# Patient Record
Sex: FEMALE | Race: BLACK OR AFRICAN AMERICAN
[De-identification: names, ages, dates, MRNs, and addresses within clinical notes are randomized per-mention and may not be internally consistent; named-entity substitution may affect disease eponyms.]

---

## 2019-09-05 ENCOUNTER — HOSPITAL ENCOUNTER (EMERGENCY)
Dept: HOSPITAL 62 - ER | Age: 29
LOS: 4 days | Discharge: PSYCHIATRIC OR SUBSTANCE USE FACILITY | End: 2019-09-09
Payer: SELF-PAY

## 2019-09-05 DIAGNOSIS — T65.892A: Primary | ICD-10-CM

## 2019-09-05 DIAGNOSIS — T54.92XA: ICD-10-CM
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
## 2019-09-05 DIAGNOSIS — F23: ICD-10-CM

## 2019-09-05 LAB
ADD MANUAL DIFF: NO
ALBUMIN SERPL-MCNC: 4.5 G/DL (ref 3.5–5)
ALP SERPL-CCNC: 49 U/L (ref 38–126)
ANION GAP SERPL CALC-SCNC: 9 MMOL/L (ref 5–19)
APAP SERPL-MCNC: < 10 UG/ML (ref 10–30)
APPEARANCE UR: (no result)
APTT PPP: (no result) S
AST SERPL-CCNC: 34 U/L (ref 14–36)
BARBITURATES UR QL SCN: NEGATIVE
BASOPHILS # BLD AUTO: 0 10^3/UL (ref 0–0.2)
BASOPHILS NFR BLD AUTO: 0.4 % (ref 0–2)
BILIRUB DIRECT SERPL-MCNC: 0.3 MG/DL (ref 0–0.4)
BILIRUB SERPL-MCNC: 0.9 MG/DL (ref 0.2–1.3)
BILIRUB UR QL STRIP: (no result)
BUN SERPL-MCNC: 14 MG/DL (ref 7–20)
CALCIUM: 9.7 MG/DL (ref 8.4–10.2)
CHLORIDE SERPL-SCNC: 109 MMOL/L (ref 98–107)
CO2 SERPL-SCNC: 23 MMOL/L (ref 22–30)
EOSINOPHIL # BLD AUTO: 0 10^3/UL (ref 0–0.6)
EOSINOPHIL NFR BLD AUTO: 0.2 % (ref 0–6)
ERYTHROCYTE [DISTWIDTH] IN BLOOD BY AUTOMATED COUNT: 13.4 % (ref 11.5–14)
ETHANOL SERPL-MCNC: < 10 MG/DL
GLUCOSE SERPL-MCNC: 110 MG/DL (ref 75–110)
GLUCOSE UR STRIP-MCNC: 150 MG/DL
HCT VFR BLD CALC: 38.5 % (ref 36–47)
HGB BLD-MCNC: 12.5 G/DL (ref 12–15.5)
KETONES UR STRIP-MCNC: (no result) MG/DL
LYMPHOCYTES # BLD AUTO: 0.9 10^3/UL (ref 0.5–4.7)
LYMPHOCYTES NFR BLD AUTO: 18.8 % (ref 13–45)
MCH RBC QN AUTO: 26.6 PG (ref 27–33.4)
MCHC RBC AUTO-ENTMCNC: 32.5 G/DL (ref 32–36)
MCV RBC AUTO: 82 FL (ref 80–97)
METHADONE UR QL SCN: NEGATIVE
MONOCYTES # BLD AUTO: 0.5 10^3/UL (ref 0.1–1.4)
MONOCYTES NFR BLD AUTO: 11 % (ref 3–13)
NEUTROPHILS # BLD AUTO: 3.2 10^3/UL (ref 1.7–8.2)
NEUTS SEG NFR BLD AUTO: 69.6 % (ref 42–78)
NITRITE UR QL STRIP: NEGATIVE
PCP UR QL SCN: NEGATIVE
PH UR STRIP: 5 [PH] (ref 5–9)
PLATELET # BLD: 244 10^3/UL (ref 150–450)
POTASSIUM SERPL-SCNC: 3.3 MMOL/L (ref 3.6–5)
PROT SERPL-MCNC: 7.8 G/DL (ref 6.3–8.2)
PROT UR STRIP-MCNC: 100 MG/DL
RBC # BLD AUTO: 4.7 10^6/UL (ref 3.72–5.28)
SALICYLATES SERPL-MCNC: < 1 MG/DL (ref 2–20)
SP GR UR STRIP: 1.04
TOTAL CELLS COUNTED % (AUTO): 100 %
URINE AMPHETAMINES SCREEN: NEGATIVE
URINE BENZODIAZEPINES SCREEN: (no result)
URINE COCAINE SCREEN: NEGATIVE
URINE MARIJUANA (THC) SCREEN: NEGATIVE
UROBILINOGEN UR-MCNC: 2 MG/DL (ref ?–2)
WBC # BLD AUTO: 4.6 10^3/UL (ref 4–10.5)

## 2019-09-05 PROCEDURE — 93010 ELECTROCARDIOGRAM REPORT: CPT

## 2019-09-05 PROCEDURE — 99285 EMERGENCY DEPT VISIT HI MDM: CPT

## 2019-09-05 PROCEDURE — 85025 COMPLETE CBC W/AUTO DIFF WBC: CPT

## 2019-09-05 PROCEDURE — 81001 URINALYSIS AUTO W/SCOPE: CPT

## 2019-09-05 PROCEDURE — S0164 INJECTION PANTROPRAZOLE: HCPCS

## 2019-09-05 PROCEDURE — 96361 HYDRATE IV INFUSION ADD-ON: CPT

## 2019-09-05 PROCEDURE — 36415 COLL VENOUS BLD VENIPUNCTURE: CPT

## 2019-09-05 PROCEDURE — 80307 DRUG TEST PRSMV CHEM ANLYZR: CPT

## 2019-09-05 PROCEDURE — 84703 CHORIONIC GONADOTROPIN ASSAY: CPT

## 2019-09-05 PROCEDURE — 93005 ELECTROCARDIOGRAM TRACING: CPT

## 2019-09-05 PROCEDURE — 80053 COMPREHEN METABOLIC PANEL: CPT

## 2019-09-05 PROCEDURE — 96374 THER/PROPH/DIAG INJ IV PUSH: CPT
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
## 2019-09-06 RX ADMIN — HALOPERIDOL SCH MG: 5 TABLET ORAL at 18:47

## 2019-09-06 RX ADMIN — HALOPERIDOL SCH: 5 TABLET ORAL at 18:51

## 2019-09-06 RX ADMIN — BENZTROPINE MESYLATE SCH MG: 1 TABLET ORAL at 18:46

## 2019-09-06 NOTE — ER DOCUMENT REPORT
Doctor's Note


Notes: 





09/06/19 18:46


The patient is doing better today.  She was started on Haldol 5mg twice daily, 

and will be re-evaluated in the morning.
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
## 2019-09-07 RX ADMIN — HALOPERIDOL SCH MG: 5 TABLET ORAL at 09:47

## 2019-09-07 RX ADMIN — BENZTROPINE MESYLATE SCH MG: 1 TABLET ORAL at 09:47

## 2019-09-07 RX ADMIN — HALOPERIDOL SCH MG: 5 TABLET ORAL at 18:37

## 2019-09-07 NOTE — ER DOCUMENT REPORT
Doctor's Note


Notes: 





09/07/19 19:22


Rounds: Chart reviewed and patient reviewed.  Patient was initially seen for 

paranoia and psychosis.  She reportedly had been drinking cleanser with bleach 

in it.  She denies any difficulty swallowing or breathing.  Says that she has 

been here for 48 hours and is ready to be discharged.  Oral exam is normal.  No 

swelling or indications of injury to the posterior oropharynx.  Voice is normal.

 Lungs are clear.  Vital signs are all normal.  Labs are essentially 

unremarkable.  Patient appears to be medically stable for transfer or discharge.


PALLAVI Knight MD

## 2019-09-08 RX ADMIN — HALOPERIDOL SCH MG: 5 TABLET ORAL at 18:33

## 2019-09-08 RX ADMIN — BENZTROPINE MESYLATE SCH MG: 1 TABLET ORAL at 10:17

## 2019-09-08 RX ADMIN — HALOPERIDOL SCH MG: 5 TABLET ORAL at 10:16
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
## 2019-09-08 NOTE — PSYCHOLOGICAL NOTE
Psych Note





- Psych Note


Date seen by psych provider: 09/08/19


Time seen by psych provider: 08:31 - Chart review at 0831. Discussion with ED 

Physician.


Psych Note: 


Presenting Problem: IVC, OD via drank half a bottle of cleaning agent with 

bleach after a first bottle was removed from her, tried to take rat poisoning 

but it was removed from her, she did these OD attempts in [facility] with her 3 

children (2 are with her sister, 1 is with the child's father), paranoia. She 

was started on Haldol 5MG BID and Cogentin 1MG QD 09/06/19. Today per attending 

ED Physician patient still presented paranoid. She has been calm, cooperative 

and pleasant while in the ED. She is on her menses currently. UDS was positive 

for Benzodiazepines (EMS had to administer Versed and antipsychotic- Haldol).   





Continued placement efforts today. If no acceptance by tomorrow (09/09/19) will 

try to come up with plan to include family for discharge.


 


Diagnosis:


SI with attempt via drank half a bottle of cleaning agent with bleach and tried 

to take rat poisoning


Unspecified Psychosis





Impression/Plan: Recommendation to maintain IVC. Patient presented to the ED 

afer being at [employer] with her 3 children and tried to OD on cleaning agent with

bleach as well as rat poisoning. Medications started late two days ago. 

Continued placement efforts. Consulted with Dr. Bowers regarding the management

and care of patient. ED Physician in agreement with recommendations.

## 2019-09-09 VITALS — SYSTOLIC BLOOD PRESSURE: 134 MMHG | DIASTOLIC BLOOD PRESSURE: 76 MMHG

## 2019-09-09 RX ADMIN — HALOPERIDOL SCH MG: 5 TABLET ORAL at 10:21

## 2019-09-09 RX ADMIN — BENZTROPINE MESYLATE SCH MG: 1 TABLET ORAL at 10:21
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
